# Patient Record
Sex: FEMALE | Race: WHITE | NOT HISPANIC OR LATINO | Employment: OTHER | URBAN - METROPOLITAN AREA
[De-identification: names, ages, dates, MRNs, and addresses within clinical notes are randomized per-mention and may not be internally consistent; named-entity substitution may affect disease eponyms.]

---

## 2017-06-29 ENCOUNTER — ALLSCRIPTS OFFICE VISIT (OUTPATIENT)
Dept: OTHER | Facility: OTHER | Age: 42
End: 2017-06-29

## 2017-06-29 ENCOUNTER — TRANSCRIBE ORDERS (OUTPATIENT)
Dept: MAMMOGRAPHY | Facility: CLINIC | Age: 42
End: 2017-06-29

## 2017-12-26 ENCOUNTER — ALLSCRIPTS OFFICE VISIT (OUTPATIENT)
Dept: OTHER | Facility: OTHER | Age: 42
End: 2017-12-26

## 2017-12-26 LAB — S PYO AG THROAT QL: NEGATIVE

## 2017-12-27 NOTE — PROGRESS NOTES
Assessment   1  Former smoker (D94 32) (Q19 027)   · quit 2011   2  Left otitis media with effusion (381 4) (H65 92)   3  Sinus pressure (478 19) (J34 89)    Plan   Left otitis media with effusion    · Amoxicillin 500 MG Oral Tablet; TAKE 1 TABLET 3 TIMES DAILY UNTIL GONE  Sinus pressure    · PredniSONE 10 MG Oral Tablet; 4 pills daily with food for 2 days, 3 pills daily for 2    days, 2 pills daily  for 2 days, one pill daily for 2 days  Sore throat    · (1) THROAT CULTURE (CULTURE, UPPER RESPIRATORY); Status: In Progress -    Specimen/Data Collected,Retrospective By Protocol Authorization;   Done: [id number]   · Rapid StrepA- POC; Source:Throat; Status:Resulted - Requires Verification,Retrospective    By Protocol Authorization;   Done: 84ANQ6661 10:55AM    Discussion/Summary      URI/ left OM - rapid strep was negative, will obtain throat culture and call with results if abnormal, rest, plenty of fluids, start Prednisone taper and Amoxicillin, f/u in the office if symptoms persist or worsen  Possible side effects of new medications were reviewed with the patient/guardian today  The treatment plan was reviewed with the patient/guardian  The patient/guardian understands and agrees with the treatment plan      Chief Complaint   Pt presents in the office today with c/o a sore throat, congestion and L ear pain times 6 days;    due  due 07/02/2015      History of Present Illness   HPI: Pt presents today with c/o nasal congestion, runny nose, sore throat and low grade fevers for 6 days, she woke up this am with left ear pain, no ear drainage, no purulent nasal d/c, she is also having mild non productive cough, no wheezing, no chest pain or SOB  Pt has been using saline nasal spray  Review of Systems        Constitutional: feeling tired, but-- no fever-- and-- no chills  ENT: earache,-- sore throat-- and-- nasal discharge, but-- no hoarseness        Cardiovascular: no complaints of slow or fast heart rate, no chest pain, no palpitations, no leg claudication or lower extremity edema  Respiratory: cough, but-- no shortness of breath,-- no wheezing-- and-- no shortness of breath during exertion  Gastrointestinal: no complaints of abdominal pain, no constipation, no nausea or diarrhea, no vomiting, no bloody stools  Active Problems   1  Gestational Diabetes Mellitus (648 80)   2  Left otitis media with effusion (381 4) (H65 92)   3  Nervousness (799 21) (R45 0)   4  Otitis media, right (382 9) (H66 91)   5  Panic Disorder Without Agoraphobia (300 01)   6  Sinus pressure (478 19) (J34 89)   7  Sore throat (462) (J02 9)    Past Medical History   1  History of Amenorrhea (626 0) (N91 2)   2  History of Dyspepsia (536 8) (K30)   3  History of sinusitis (V12 69) (Z87 09)   4  History of Mastitis (611 0) (N61 0)   5  History of Postpartum Depression (648 40)   6  History of Sinusitis (473 9) (J32 9)    Family History   Mother    1  Family history of Arthritis (V17 7)   2  Family history of Hyperlipidemia   3  Family history of Hypertension (V17 49)   4  Family history of Ovarian Cancer (V16 41)   5  Patient's mother is  (V24 11) (Z80 80)  Father    10  Family history of Diabetes Mellitus (V18 0)   7  Family history of Hyperlipidemia   8  Family history of Prostate Cancer (L41 64)  Multiple Family Members    9  Denied: Family history of drug abuse   10  Denied: Family history of Mental health problem  Family History Reviewed: The family history was reviewed and updated today  Social History    · Always uses seat belt   · Denied: History of Caffeine use   · Denied: History of Drug Use   · Former smoker (D99 61) (E73 691)   · quit    · Has smoke detectors   · No alcohol use  The social history was reviewed and updated today  Surgical History   1  History of  Section    Current Meds    1  Sertraline HCl - 50 MG Oral Tablet; TAKE ONE TABLET BY MOUTH EVERY DAY     NIGHTLY;      Therapy: [id number] to ([place] [facility])  Requested for: 27YJP9622; Last     Rx:09Nov2017 Ordered    Allergies   1  Demerol TABS   2  Morphine Derivatives  3  Animal dander - Cats   4  Dust    Vitals    Recorded: 26Dec2017 10:34AM   Temperature 98 3 F   Heart Rate 78   Respiration 16   Systolic 086   Diastolic 72   Height 5 ft 3 in   Weight 219 lb 3 2 oz   BMI Calculated 38 83   BSA Calculated 2 01     Physical Exam        Constitutional      General appearance: No acute distress, well appearing and well nourished  Ears, Nose, Mouth, and Throat      Otoscopic examination: Abnormal   The right tympanic membrane was normal  The left tympanic membrane was red  The right external canal was normal  The left external canal was normal  Exam of the left middle ear showed a middle ear effusion  Nasal mucosa, septum, and turbinates: Abnormal   no nasal discharge  The bilateral nasal mucosa was edematous-- and-- red  Oropharynx: Abnormal   The posterior pharynx was erythematous, but-- did not have an exudate  Pulmonary      Respiratory effort: No increased work of breathing or signs of respiratory distress  Auscultation of lungs: Clear to auscultation  Cardiovascular      Auscultation of heart: Normal rate and rhythm, normal S1 and S2, without murmurs  Lymphatic      Palpation of lymph nodes in neck: No lymphadenopathy            Results/Data   Rapid Lis Deem- POC [id number] 10:55AM [place]      Test Name Result Flag Reference   Rapid Strep Negative                      Signatures    Electronically signed by : TAD Osorio ; Dec 26 2017  1:04PM EST                       (Author)

## 2017-12-28 LAB — CULTURE RESULT (HISTORICAL): NORMAL

## 2018-01-13 VITALS
HEART RATE: 72 BPM | SYSTOLIC BLOOD PRESSURE: 126 MMHG | BODY MASS INDEX: 36.86 KG/M2 | RESPIRATION RATE: 16 BRPM | HEIGHT: 63 IN | WEIGHT: 208 LBS | TEMPERATURE: 98.6 F | DIASTOLIC BLOOD PRESSURE: 74 MMHG

## 2018-01-22 VITALS
WEIGHT: 219.2 LBS | TEMPERATURE: 98.3 F | DIASTOLIC BLOOD PRESSURE: 72 MMHG | BODY MASS INDEX: 38.84 KG/M2 | SYSTOLIC BLOOD PRESSURE: 116 MMHG | HEIGHT: 63 IN | RESPIRATION RATE: 16 BRPM | HEART RATE: 78 BPM

## 2018-05-16 DIAGNOSIS — F32.A DEPRESSION, UNSPECIFIED DEPRESSION TYPE: Primary | ICD-10-CM

## 2018-11-14 DIAGNOSIS — F32.A DEPRESSION, UNSPECIFIED DEPRESSION TYPE: ICD-10-CM

## 2019-05-12 DIAGNOSIS — F32.A DEPRESSION, UNSPECIFIED DEPRESSION TYPE: ICD-10-CM

## 2019-11-03 ENCOUNTER — TELEPHONE (OUTPATIENT)
Dept: FAMILY MEDICINE CLINIC | Facility: CLINIC | Age: 44
End: 2019-11-03

## 2019-11-03 DIAGNOSIS — F32.A DEPRESSION, UNSPECIFIED DEPRESSION TYPE: ICD-10-CM

## 2019-11-04 DIAGNOSIS — Z13.29 SCREENING FOR THYROID DISORDER: ICD-10-CM

## 2019-11-04 DIAGNOSIS — Z13.228 SCREENING FOR METABOLIC DISORDER: Primary | ICD-10-CM

## 2019-11-04 DIAGNOSIS — Z13.0 SCREENING, ANEMIA, DEFICIENCY, IRON: ICD-10-CM

## 2019-11-04 DIAGNOSIS — Z13.220 SCREENING, LIPID: ICD-10-CM

## 2019-12-08 DIAGNOSIS — F32.A DEPRESSION, UNSPECIFIED DEPRESSION TYPE: ICD-10-CM

## 2019-12-18 NOTE — TELEPHONE ENCOUNTER
Patient is booked for the 27th however she will be short 2 pills prior to the visit    Sertriline    [facility] on [address]

## 2019-12-19 DIAGNOSIS — F32.A DEPRESSION, UNSPECIFIED DEPRESSION TYPE: ICD-10-CM

## 2020-01-07 LAB
ALBUMIN SERPL-MCNC: 4.3 G/DL (ref 3.6–5.1)
ALBUMIN/GLOB SERPL: 1.2 (CALC) (ref 1–2.5)
ALP SERPL-CCNC: 56 U/L (ref 33–115)
ALT SERPL-CCNC: 16 U/L (ref 6–29)
AST SERPL-CCNC: 16 U/L (ref 10–30)
BASOPHILS # BLD AUTO: 68 CELLS/UL (ref 0–200)
BASOPHILS NFR BLD AUTO: 1.2 %
BILIRUB SERPL-MCNC: 0.5 MG/DL (ref 0.2–1.2)
BUN SERPL-MCNC: 13 MG/DL (ref 7–25)
BUN/CREAT SERPL: NORMAL (CALC) (ref 6–22)
CALCIUM SERPL-MCNC: 9.3 MG/DL (ref 8.6–10.2)
CHLORIDE SERPL-SCNC: 100 MMOL/L (ref 98–110)
CHOLEST SERPL-MCNC: 260 MG/DL
CHOLEST/HDLC SERPL: 5.2 (CALC)
CO2 SERPL-SCNC: 29 MMOL/L (ref 20–32)
CREAT SERPL-MCNC: 0.6 MG/DL (ref 0.5–1.1)
EOSINOPHIL # BLD AUTO: 131 CELLS/UL (ref 15–500)
EOSINOPHIL NFR BLD AUTO: 2.3 %
ERYTHROCYTE [DISTWIDTH] IN BLOOD BY AUTOMATED COUNT: 13.2 % (ref 11–15)
GLOBULIN SER CALC-MCNC: 3.5 G/DL (CALC) (ref 1.9–3.7)
GLUCOSE SERPL-MCNC: 96 MG/DL (ref 65–99)
HCT VFR BLD AUTO: 42.3 % (ref 35–45)
HDLC SERPL-MCNC: 50 MG/DL
HGB BLD-MCNC: 14.2 G/DL (ref 11.7–15.5)
LDLC SERPL CALC-MCNC: 185 MG/DL (CALC)
LYMPHOCYTES # BLD AUTO: 1682 CELLS/UL (ref 850–3900)
LYMPHOCYTES NFR BLD AUTO: 29.5 %
MCH RBC QN AUTO: 28.5 PG (ref 27–33)
MCHC RBC AUTO-ENTMCNC: 33.6 G/DL (ref 32–36)
MCV RBC AUTO: 84.9 FL (ref 80–100)
MONOCYTES # BLD AUTO: 405 CELLS/UL (ref 200–950)
MONOCYTES NFR BLD AUTO: 7.1 %
NEUTROPHILS # BLD AUTO: 3414 CELLS/UL (ref 1500–7800)
NEUTROPHILS NFR BLD AUTO: 59.9 %
NONHDLC SERPL-MCNC: 210 MG/DL (CALC)
PLATELET # BLD AUTO: 278 THOUSAND/UL (ref 140–400)
PMV BLD REES-ECKER: 9.6 FL (ref 7.5–12.5)
POTASSIUM SERPL-SCNC: 3.6 MMOL/L (ref 3.5–5.3)
PROT SERPL-MCNC: 7.8 G/DL (ref 6.1–8.1)
RBC # BLD AUTO: 4.98 MILLION/UL (ref 3.8–5.1)
SL AMB EGFR AFRICAN AMERICAN: 129 ML/MIN/1.73M2
SL AMB EGFR NON AFRICAN AMERICAN: 112 ML/MIN/1.73M2
SODIUM SERPL-SCNC: 139 MMOL/L (ref 135–146)
TRIGL SERPL-MCNC: 118 MG/DL
TSH SERPL-ACNC: 2.11 MIU/L
WBC # BLD AUTO: 5.7 THOUSAND/UL (ref 3.8–10.8)

## 2020-01-09 ENCOUNTER — OFFICE VISIT (OUTPATIENT)
Dept: FAMILY MEDICINE CLINIC | Facility: CLINIC | Age: 45
End: 2020-01-09
Payer: COMMERCIAL

## 2020-01-09 VITALS
BODY MASS INDEX: 36.25 KG/M2 | SYSTOLIC BLOOD PRESSURE: 116 MMHG | HEART RATE: 72 BPM | WEIGHT: 197 LBS | DIASTOLIC BLOOD PRESSURE: 70 MMHG | HEIGHT: 62 IN

## 2020-01-09 DIAGNOSIS — R45.0 NERVOUSNESS: ICD-10-CM

## 2020-01-09 DIAGNOSIS — Z12.31 ENCOUNTER FOR SCREENING MAMMOGRAM FOR BREAST CANCER: ICD-10-CM

## 2020-01-09 DIAGNOSIS — Z01.419 GYNECOLOGIC EXAM NORMAL: ICD-10-CM

## 2020-01-09 DIAGNOSIS — H02.66 XANTHELASMA OF EYELID, BILATERAL: ICD-10-CM

## 2020-01-09 DIAGNOSIS — F41.0 PANIC DISORDER WITHOUT AGORAPHOBIA: ICD-10-CM

## 2020-01-09 DIAGNOSIS — H02.63 XANTHELASMA OF EYELID, BILATERAL: ICD-10-CM

## 2020-01-09 DIAGNOSIS — F32.A DEPRESSION, UNSPECIFIED DEPRESSION TYPE: ICD-10-CM

## 2020-01-09 DIAGNOSIS — Z12.4 PAP SMEAR FOR CERVICAL CANCER SCREENING: ICD-10-CM

## 2020-01-09 DIAGNOSIS — Z01.419 ENCOUNTER FOR GYNECOLOGICAL EXAMINATION WITH PAPANICOLAOU SMEAR OF CERVIX: Primary | ICD-10-CM

## 2020-01-09 DIAGNOSIS — E78.00 HYPERCHOLESTEROLEMIA: ICD-10-CM

## 2020-01-09 PROCEDURE — 3008F BODY MASS INDEX DOCD: CPT | Performed by: FAMILY MEDICINE

## 2020-01-09 PROCEDURE — 99214 OFFICE O/P EST MOD 30 MIN: CPT | Performed by: FAMILY MEDICINE

## 2020-01-09 PROCEDURE — 99396 PREV VISIT EST AGE 40-64: CPT | Performed by: FAMILY MEDICINE

## 2020-01-09 NOTE — PROGRESS NOTES
Assessment/plan;    Pap test done /gyn exam done  Cervix had small little round lesions like poker dots all over it  These were not tender and are not raised   These areas were swiped well with the brush for any abnormalities  We will send a card regarding her Pap test  Patient will get her mammogram done  Next Pap test 2 years         Patient is here for her yearly checkup  She is ready to get her mammogram as she had prolonged nursing with her last child and this is done   She had a tubal ligation    Her periods are starting to get very a regular as she feels she has around [place] menopausal  She does not know when her mother was menopausal   She has no gyn complaints      Health Maintenance   Topic Date Due    MAMMOGRAM  1976    HIV Screening  1991    BMI: Followup Plan  1994    Cervical Cancer Screening  1997    Influenza Vaccine  2019    Depression Screening PHQ  2021    BMI: Adult  2021    DTaP,Tdap,and Td Vaccines (3 - Td) 2023    Pneumococcal Vaccine: 65+ Years (1 of 2 - PCV13) 2041    Pneumococcal Vaccine: Pediatrics (0 to 5 Years) and At-Risk Patients (6 to 59 Years)  Aged Out    HIB Vaccine  Aged Out    Hepatitis B Vaccine  Aged Out    IPV Vaccine  Aged Out    Hepatitis A Vaccine  Aged Out    Meningococcal ACWY Vaccine  Aged Out    HPV Vaccine  Aged Out       Gynecologic History  No LMP recorded   Patient is perimenopausal      Contraception: tubal ligation  Next mammogram:now  Last DXA:NA  Next colon if >48 y/o: NA     Social History     Tobacco Use    Smoking status: Former Smoker     Last attempt to quit: 2011     Years since quittin 0    Smokeless tobacco: Never Used   Substance Use Topics    Alcohol use: Never     Frequency: Never    Drug use: Never     Comment: Denied: History of drug use - As per [employer]      Family History   Problem Relation Age of Onset    Arthritis Mother     Hyperlipidemia Mother     Hypertension Mother     Ovarian cancer Mother     Diabetes Father     Hyperlipidemia Father     Prostate cancer Father     Breast cancer Sister     Alcohol abuse Neg Hx     Substance Abuse Neg Hx     Mental illness Neg Hx      Past Surgical History:   Procedure Laterality Date     SECTION      1999, second       Current Outpatient Medications   Medication Sig Dispense Refill    sertraline (ZOLOFT) 50 mg tablet Take 1 tablet (50 mg total) by mouth every evening 90 tablet 3     No current facility-administered medications for this visit             Review of Systems   Constitutional  No fatigue, No weight loss, No weight gain, No fever, No chills    Respiratory  No dyspnea, No cough, No hemoptysis, No wheezing, No pleuritic pain    Cardiovascular  No chest pain, No palpitations, No arrhythmia, No orthopnea, No nocturnal dyspnea, No edema, No claudication    Breasts (R,L)  No discharge from nipple, No breast tenderness, No breast mass    Gastrointestinal  No loss of appetite, No dysphagia, No abdominal pain, No nausea, No vomiting, No change in bowel habits, No diarrhea, No constipation, No blood in stool    Genitourinary, Female  No increased frequency of urination, No dysuria, No hematuria, No nocturia, No urinary incontinence, No vaginal discharge, No abnormal vaginal bleeding, No pelvic pain,  Menstrual periods are becoming irregularly spaced, No menopausal problem    Neurologic  No headache, No dizziness, No lightheadedness, No syncope, No vertigo, No weakness, No numbness, No paresthesia, No tremor    Psychiatric  No difficulty sleeping, No mood swings, Not feeling anxious, Not feeling depressed, No confusion, No memory loss    Muscular skeletal  Myalgias, arthralgias, no joint swelling or stiffness, no limb pain or swelling    Heme  No swollen glands, no easy bruising    Objective:    Vitals:    20 1609   BP: 116/70   Pulse: 72       Lab Results   Component Value Date    WBC 5 7 2020 HGB 14 2 01/06/2020    HCT 42 3 01/06/2020     01/06/2020    TRIG 118 01/06/2020    HDL 50 (L) 01/06/2020    ALT 16 01/06/2020    AST 16 01/06/2020    K 3 6 01/06/2020     01/06/2020    CREATININE 0 60 01/06/2020    BUN 13 01/06/2020    CO2 29 01/06/2020     BP Readings from Last 3 Encounters:   01/09/20 116/70   12/26/17 116/72   06/29/17 126/74     Ht Readings from Last 3 Encounters:   01/09/20 5' 2 25" (1 581 m)   12/26/17 5' 3" (1 6 m)   06/29/17 5' 3" (1 6 m)     @lastweight(3)@  BMI Readings from Last 3 Encounters:   01/09/20 35 74 kg/m²   12/26/17 38 83 kg/m²   06/29/17 36 85 kg/m²          Physical Exam  Constitutional  Appears well, Looks well, Appearance consistent with age    Mental Status  Alert, Cooperative, oriented to time person and place, recent and remote memory intact, mood and affect normal , patient is reasonable    Neck  No neck mass, No thyromegaly, No thyroid nodule, No thyroid tenderness    Respiratory  Respiratory effort normal, Breath sounds normal, No rales, No rhonchi, No wheezing     Cardiac  Heart rate normal, Heart rhythm normal, Heart sounds normal, No heart murmur    Vascular  Carotid pulse normal, No carotid bruit, No varicose veins, No leg edema    Breasts (R,L)  No discharge from nipple, No breast tenderness, No breast mass, No nipple retraction, No skin changes    Gastrointestinal  Bowel sounds normal, Abdomen soft, No hepatomegaly, No splenomegaly, No abdominal tenderness, No abdominal mass, No hernia, No hemorrhoids    Genitourinary, Female  Vulva normal, No erythema of urethra    Genitourinary, Female  Vulva normal, No erythema of vulva, Vaginal mucosa normal, No vaginal discharge, No lesion of urethra, No urethral discharge, No bladder distention, No bladder tenderness, Cervix normal, No cervical inflammation,  Small less than 1 mm round dots almost circles too numerous to count and appeared flat all around patient's cervix    Some  Seem to be on the vagina proximal to the cervix, No cervical discharge, No cervical tenderness, Uterus normal size, No uterine tenderness, No adnexal tenderness, No adnexal mass, No pelvic mass    Lymphatics  No axillary lymphadenopathy, No inguinal lymphadenopathy    Skin  Pattern of hair growth normal, No skin rash, No abnormal skin lesion, No acne

## 2020-01-09 NOTE — PATIENT INSTRUCTIONS
1  You labs are perfect other than your cholesterol, we will check it again next year as you continue to do your awesome job losing weight    2  Please get her mammogram done and the place we mammogram gets done we will call you with results    3   We will either send you a card or call you with your results on her Pap test    When your on her last refill of your sertraline, consider calling the office to make an appointment for a year from now and ask us to get you lab slips so he could get the blood work done before you come in  Otherwise we will see you as needed prior to that time

## 2020-01-09 NOTE — PROGRESS NOTES
Assessment/Plan:    Nervousness/panic disorder/ depression  Patient doing great with sertraline will continue the same  Refills called in for year   When patient gets close to the end of the refill she will call in for blood work in her yearly check    Elevated blood sugars during pregnancy   We will do an A1c next visit as well specially since her dad has diabetes     Xanthelasma / hypercholesterolemia   Patient noticed her eye changes when she started gaining weight   Hopefully with continued loss await her cholesterol will improve and her eye lesions will disappear    Recheck in 1 year   Screening labs A1c prior        BMI Counseling: Body mass index is 35 74 kg/m²  The BMI is above normal  Nutrition recommendations include decreasing portion sizes and encouraging healthy choices of fruits and vegetables  Exercise recommendations include exercising 3-5 times per week  Patient has already lost 40 lb on her own  She is happy with her diet of lean meat and vegetables and will continue with the same  She has had no brain fogginess            Subjective:   Caitlyn Lopez is a 38 y  o female  Chief Complaint   Patient presents with    Gynecologic Exam      Patient is here for her yearly checkup  She takes her sertraline which works wonderfully for her    She is proud to tell us that she has lost 40 lb on a keto diet that she eats plenty of vegetables but stays away from everything white  She did notice that her eyelids changed colors when she started gaining a lot of weight  She gained a lot a weight when her mother who she was very close to   She ate food when she was sad  And she had a lot  When her feet started hurting could her arches were falling in her back started hurting she decided to stop and to get herself back in line              Past Medical History:   Diagnosis Date    Amenorrhea     Last assessed 2013     Dyspepsia     Last assessed 2013     Postpartum depression     Onset date       Social History     Tobacco Use    Smoking status: Former Smoker     Last attempt to quit: 2011     Years since quittin 0    Smokeless tobacco: Never Used   Substance Use Topics    Alcohol use: Never     Frequency: Never    Drug use: Never     Comment: Denied: History of drug use - As per [employer]      Family History   Problem Relation Age of Onset    Arthritis Mother     Hyperlipidemia Mother     Hypertension Mother     Ovarian cancer Mother     Diabetes Father     Hyperlipidemia Father     Prostate cancer Father     Breast cancer Sister     Alcohol abuse Neg Hx     Substance Abuse Neg Hx     Mental illness Neg Hx        MEDICATIONS REVIEWED AND UPDATED    10 point review of systems performed, the remainder of the ROS is negative except for what is noted in the history of chief complaint    Objective:    Vitals:    20 1609   BP: 116/70   Pulse: 72     Body mass index is 35 74 kg/m²  Physical Exam    General  Patient in no acute distress, well appearing, well nourished and appears stated age    Mental status  Patient admitted to missing office visits out of fear of what could be found! Good judgment and insight, oriented to time person and place, recent and remote memory is intact, mood and affect are normal, cooperative, and patient is reasonable

## 2020-01-14 LAB
CLINICAL INFO: NORMAL
DATE PREVIOUS BX: NORMAL
LMP START DATE: NORMAL
SL AMB PREV. PAP:: NORMAL
SPECIMEN SOURCE CVX/VAG CYTO: NORMAL

## 2020-01-21 ENCOUNTER — PROCEDURE VISIT (OUTPATIENT)
Dept: FAMILY MEDICINE CLINIC | Facility: CLINIC | Age: 45
End: 2020-01-21
Payer: COMMERCIAL

## 2020-01-21 VITALS
HEART RATE: 72 BPM | HEIGHT: 63 IN | SYSTOLIC BLOOD PRESSURE: 120 MMHG | BODY MASS INDEX: 35.3 KG/M2 | DIASTOLIC BLOOD PRESSURE: 70 MMHG | WEIGHT: 199.2 LBS

## 2020-01-21 DIAGNOSIS — D49.2 SKIN NEOPLASM: Primary | ICD-10-CM

## 2020-01-21 PROCEDURE — 11300 SHAVE SKIN LESION 0.5 CM/<: CPT | Performed by: FAMILY MEDICINE

## 2020-01-21 NOTE — PATIENT INSTRUCTIONS
Keep the area clean and dry for the rest of today  Tomorrow take off the dressing and take a shower or wash as she normally would  Please wash this area with soap and water being careful to the area  Please put antibiotic or Vaseline on the pad of a Band-Aid and cover this area    If you cannot reach your self, please ask someone to assist you  Please do this for at least 10 days or until it is totally healed    Please call if you see any signs of infection such as:  Redness  Discharge  You have pain  The area swollen

## 2020-01-21 NOTE — PROGRESS NOTES
Patient is here with 2 pedunculated moles that have continuously pulled on her back in her front from her bra  They are irritated and continue to get longer  There is 1 on her front left area under her breast on her ribs that as a 3 mm base  There is 1 on her right back at the tip of her scapula also with a 3 mm meter base   Both have brown coloration to them    Shave lesion  Date/Time: 1/21/2020 12:54 PM  Performed by: Hood Gresham DO  Authorized by: Hood Gresham DO     Number of Lesions: 2  Lesion 1:     Body area: trunk    Trunk location: chest    Initial size (mm): 3    Final defect size (mm): 4    Malignancy: benign lesion      Destruction method: shave removal    Lesion 2:     Body area: trunk    Trunk location: back    Initial size (mm): 3    Final defect size (mm): 4    Malignancy: benign lesion      Destruction method: shave removal      Comments:   Procedure:  Consent was signed after explained to patient   each Area was cleaned with alcohol and anesthetized with epinephrine 1% and a 9-1 mixture with sodium bicarb  The area was shaved and cautery was established with calcium chloride and electrodesiccation    Area was dressed with antibiotic ointment and a Band-Aid   Patient tolerated procedure well  Pathology sent

## 2020-01-24 LAB
CLINICAL INFO: NORMAL
SPECIMEN SOURCE: NORMAL

## 2020-01-25 NOTE — RESULT ENCOUNTER NOTE
Please call patient regarding skin biopsy  It is negative  Nothing more needs to be done    Thank you

## 2020-03-13 ENCOUNTER — OFFICE VISIT (OUTPATIENT)
Dept: FAMILY MEDICINE CLINIC | Facility: CLINIC | Age: 45
End: 2020-03-13
Payer: COMMERCIAL

## 2020-03-13 VITALS
DIASTOLIC BLOOD PRESSURE: 80 MMHG | BODY MASS INDEX: 37.49 KG/M2 | RESPIRATION RATE: 16 BRPM | HEART RATE: 74 BPM | WEIGHT: 211.6 LBS | SYSTOLIC BLOOD PRESSURE: 122 MMHG | HEIGHT: 63 IN | TEMPERATURE: 98.8 F

## 2020-03-13 DIAGNOSIS — J20.9 ACUTE BRONCHITIS, UNSPECIFIED ORGANISM: Primary | ICD-10-CM

## 2020-03-13 DIAGNOSIS — J01.00 ACUTE NON-RECURRENT MAXILLARY SINUSITIS: ICD-10-CM

## 2020-03-13 DIAGNOSIS — R05.9 COUGH: ICD-10-CM

## 2020-03-13 PROCEDURE — 1036F TOBACCO NON-USER: CPT | Performed by: FAMILY MEDICINE

## 2020-03-13 PROCEDURE — 3008F BODY MASS INDEX DOCD: CPT | Performed by: FAMILY MEDICINE

## 2020-03-13 PROCEDURE — 99214 OFFICE O/P EST MOD 30 MIN: CPT | Performed by: FAMILY MEDICINE

## 2020-03-13 RX ORDER — PREDNISONE 10 MG/1
TABLET ORAL
Qty: 20 TABLET | Refills: 0 | Status: SHIPPED | OUTPATIENT
Start: 2020-03-13 | End: 2020-04-16 | Stop reason: ALTCHOICE

## 2020-03-13 RX ORDER — AZITHROMYCIN 250 MG/1
TABLET, FILM COATED ORAL
Qty: 10 TABLET | Refills: 0 | Status: SHIPPED | OUTPATIENT
Start: 2020-03-13 | End: 2020-03-23

## 2020-03-13 NOTE — PATIENT INSTRUCTIONS
Acute bronchitis with right-sided maxillary sinusitis  Patient will do the followin  She will take the azithromycin, the antibiotic 1 daily until the bottle is empty    2  She will take prednisone as follows:   Today Friday and tomorrow Saturday 4 pills at once after eating   and Monday 3 pills at once after eating   2 pills at once after eating  Thursday and Friday 1 pill daily after eating    If she is not at least improving a little bit 48 hours she is to let us now  If she gets worse she is to let us now  Otherwise she should just gradually improving start feeling better  The prednisone will help open up her lungs and decrease her cough and the antibiotic will take away the infection    Otherwise recheck as scheduled or needed

## 2020-03-13 NOTE — PROGRESS NOTES
Assessment/Plan:    Acute bronchitis with right-sided maxillary sinusitis  Patient will do the followin  She will take the azithromycin, the antibiotic 1 daily until the bottle is empty    2  She will take prednisone as follows: Today Friday and tomorrow Saturday 4 pills at once after eating   and Monday 3 pills at once after eating   2 pills at once after eating  Thursday and Friday 1 pill daily after eating    If she is not at least improving a little bit 48 hours she is to let us now  If she gets worse she is to let us now  Otherwise she should just gradually improving start feeling better  The prednisone will help open up her lungs and decrease her cough and the antibiotic will take away the infection    Otherwise recheck as scheduled or needed               Subjective:   Bella Armas is a 38 y  o female  Chief Complaint   Patient presents with    Cough    Fever     104 4 on tuesday     chest congestion    Wheezing     A week ago today patient started with a headache that eventually developed 2 days later into general malaise and then a fever of 104 4  She took a tepid bath, took some Tylenol and the fever came down  Did not have the fever ever since that time  However she did develop a cough with wheezing no real shortness of breath just feeling tight  She is not bringing up any sputum either  Nobody at home is ill either  She just wants to breathe better        Past Medical History:   Diagnosis Date    Amenorrhea     Last assessed 2013     Dyspepsia     Last assessed 2013     Postpartum depression     Onset date       Social History     Tobacco Use    Smoking status: Former Smoker     Last attempt to quit:      Years since quittin 2    Smokeless tobacco: Never Used   Substance Use Topics    Alcohol use: Never     Frequency: Never    Drug use: Never     Comment: Denied: History of drug use - As per [employer]      Family History   Problem Relation Age of Onset    Arthritis Mother     Hyperlipidemia Mother     Hypertension Mother     Ovarian cancer Mother     Diabetes Father     Hyperlipidemia Father     Prostate cancer Father     Breast cancer Sister     Alcohol abuse Neg Hx     Substance Abuse Neg Hx     Mental illness Neg Hx        MEDICATIONS REVIEWED AND UPDATED    10 point review of systems performed, the remainder of the ROS is negative except for what is noted in the history of chief complaint    Objective:    Vitals:    03/13/20 1032   BP: 122/80   Pulse: 74   Resp: 16   Temp: 98 8 °F (37 1 °C)     Body mass index is 37 48 kg/m²  Physical Exam    Constitutional  Appears healthy, Looks well, Appearance consistent with age    Mental Status  Alert, Oriented, Cooperative, Memory function normal , clean, and reasonable    HEENT  TMs are clear normal turbinates red and open right side is somewhat close with white yellow discharge pharynx benign  Some tenderness with percussion over right maxillary sinus    Neck  No neck mass, No thyromegaly, Good carotid upstrokes bilaterally, trachea midline positive click    Respiratory  Patient has mid to end expiratory wheezes throughout with scattered rhonchi anteriorly that transmit posteriorly    No tactile fremitus, no rales    Cardiac   Regular rhythm without ectopy or murmur no S3-S4, no heave lift or thrill to palpation    Vascular  No leg edema, No pedal edema    Muscular skeletal  No clubbing cyanosis , muscle tone normal    Skin  No appreciable rashes or abnormal appearing lesions

## 2020-03-17 ENCOUNTER — TELEPHONE (OUTPATIENT)
Dept: FAMILY MEDICINE CLINIC | Facility: CLINIC | Age: 45
End: 2020-03-17

## 2020-03-17 NOTE — TELEPHONE ENCOUNTER
Patient was in to see you Friday  She was diagnosed with Bronchitis  She is on a Zpack and is taking Prednisone  She is not yet finished with the medication  She wants to know if the Bronchitis should have been cleared by now?     Best number for Reji green:  [phone]

## 2020-04-15 ENCOUNTER — TELEPHONE (OUTPATIENT)
Dept: FAMILY MEDICINE CLINIC | Facility: CLINIC | Age: 45
End: 2020-04-15

## 2020-04-16 DIAGNOSIS — F32.A DEPRESSION, UNSPECIFIED DEPRESSION TYPE: ICD-10-CM

## 2021-01-31 DIAGNOSIS — Z79.899 ENCOUNTER FOR LONG-TERM (CURRENT) USE OF MEDICATIONS: ICD-10-CM

## 2021-01-31 DIAGNOSIS — E78.00 HYPERCHOLESTEROLEMIA: Primary | ICD-10-CM

## 2021-01-31 DIAGNOSIS — F32.A DEPRESSION, UNSPECIFIED DEPRESSION TYPE: ICD-10-CM

## 2021-01-31 DIAGNOSIS — R73.01 ELEVATED FASTING BLOOD SUGAR: ICD-10-CM

## 2021-01-31 NOTE — TELEPHONE ENCOUNTER
Received patient's request for sertraline  I will send 30 pills  Patient needs to have fasting blood work done and a 30 minutes office visit in order to get more refills  Please inform patient of the above, thank you

## 2021-03-05 DIAGNOSIS — F32.A DEPRESSION, UNSPECIFIED DEPRESSION TYPE: ICD-10-CM

## 2021-03-30 DIAGNOSIS — Z23 ENCOUNTER FOR IMMUNIZATION: ICD-10-CM

## 2021-04-29 DIAGNOSIS — F32.A DEPRESSION, UNSPECIFIED DEPRESSION TYPE: ICD-10-CM

## 2021-04-29 NOTE — TELEPHONE ENCOUNTER
For you have already called this patient in January to set up her recheck  Her labs are ordered in she needs a 30 minutes office visit   Received another request for her sertraline   Only 30 days will be called in

## 2021-05-29 DIAGNOSIS — F32.A DEPRESSION, UNSPECIFIED DEPRESSION TYPE: ICD-10-CM

## 2021-06-03 ENCOUNTER — TELEPHONE (OUTPATIENT)
Dept: FAMILY MEDICINE CLINIC | Facility: CLINIC | Age: 46
End: 2021-06-03

## 2021-06-03 DIAGNOSIS — F32.A DEPRESSION, UNSPECIFIED DEPRESSION TYPE: ICD-10-CM

## 2021-06-03 NOTE — TELEPHONE ENCOUNTER
Pt scheduled her annual PE for 6/28 and would like to know what labs she needs prior to visit? Also, can you please refill her zoloft to get her to her appt? Send to [facility]

## 2021-06-03 NOTE — TELEPHONE ENCOUNTER
Labs are already in the system  If she needs them to be printed please print them for her  Thank you

## 2021-06-25 ENCOUNTER — APPOINTMENT (OUTPATIENT)
Dept: LAB | Facility: CLINIC | Age: 46
End: 2021-06-25
Payer: COMMERCIAL

## 2021-06-25 DIAGNOSIS — Z79.899 ENCOUNTER FOR LONG-TERM (CURRENT) USE OF OTHER MEDICATIONS: ICD-10-CM

## 2021-06-25 DIAGNOSIS — R73.01 IMPAIRED FASTING GLUCOSE: Primary | ICD-10-CM

## 2021-06-25 LAB
25(OH)D3 SERPL-MCNC: 14 NG/ML (ref 30–100)
ALBUMIN SERPL BCP-MCNC: 3.4 G/DL (ref 3.5–5)
ALP SERPL-CCNC: 66 U/L (ref 46–116)
ALT SERPL W P-5'-P-CCNC: 27 U/L (ref 12–78)
ANION GAP SERPL CALCULATED.3IONS-SCNC: 6 MMOL/L (ref 4–13)
AST SERPL W P-5'-P-CCNC: 16 U/L (ref 5–45)
BACTERIA UR QL AUTO: ABNORMAL /HPF
BASOPHILS # BLD AUTO: 0.07 THOUSANDS/ΜL (ref 0–0.1)
BASOPHILS NFR BLD AUTO: 1 % (ref 0–1)
BILIRUB SERPL-MCNC: 0.32 MG/DL (ref 0.2–1)
BILIRUB UR QL STRIP: NEGATIVE
BUN SERPL-MCNC: 8 MG/DL (ref 5–25)
CALCIUM ALBUM COR SERPL-MCNC: 9.5 MG/DL (ref 8.3–10.1)
CALCIUM SERPL-MCNC: 9 MG/DL (ref 8.3–10.1)
CHLORIDE SERPL-SCNC: 104 MMOL/L (ref 100–108)
CHOLEST SERPL-MCNC: 243 MG/DL (ref 50–200)
CLARITY UR: CLEAR
CO2 SERPL-SCNC: 27 MMOL/L (ref 21–32)
COLOR UR: YELLOW
CREAT SERPL-MCNC: 0.59 MG/DL (ref 0.6–1.3)
EOSINOPHIL # BLD AUTO: 0.18 THOUSAND/ΜL (ref 0–0.61)
EOSINOPHIL NFR BLD AUTO: 4 % (ref 0–6)
ERYTHROCYTE [DISTWIDTH] IN BLOOD BY AUTOMATED COUNT: 13.8 % (ref 11.6–15.1)
EST. AVERAGE GLUCOSE BLD GHB EST-MCNC: 131 MG/DL
GFR SERPL CREATININE-BSD FRML MDRD: 112 ML/MIN/1.73SQ M
GLUCOSE P FAST SERPL-MCNC: 114 MG/DL (ref 65–99)
GLUCOSE UR STRIP-MCNC: NEGATIVE MG/DL
HBA1C MFR BLD: 6.2 %
HCT VFR BLD AUTO: 45.7 % (ref 34.8–46.1)
HDLC SERPL-MCNC: 46 MG/DL
HGB BLD-MCNC: 14.4 G/DL (ref 11.5–15.4)
HGB UR QL STRIP.AUTO: NEGATIVE
IMM GRANULOCYTES # BLD AUTO: 0.01 THOUSAND/UL (ref 0–0.2)
IMM GRANULOCYTES NFR BLD AUTO: 0 % (ref 0–2)
KETONES UR STRIP-MCNC: NEGATIVE MG/DL
LDLC SERPL CALC-MCNC: 175 MG/DL (ref 0–100)
LEUKOCYTE ESTERASE UR QL STRIP: ABNORMAL
LYMPHOCYTES # BLD AUTO: 1.65 THOUSANDS/ΜL (ref 0.6–4.47)
LYMPHOCYTES NFR BLD AUTO: 32 % (ref 14–44)
MCH RBC QN AUTO: 27.7 PG (ref 26.8–34.3)
MCHC RBC AUTO-ENTMCNC: 31.5 G/DL (ref 31.4–37.4)
MCV RBC AUTO: 88 FL (ref 82–98)
MONOCYTES # BLD AUTO: 0.36 THOUSAND/ΜL (ref 0.17–1.22)
MONOCYTES NFR BLD AUTO: 7 % (ref 4–12)
NEUTROPHILS # BLD AUTO: 2.86 THOUSANDS/ΜL (ref 1.85–7.62)
NEUTS SEG NFR BLD AUTO: 56 % (ref 43–75)
NITRITE UR QL STRIP: NEGATIVE
NON-SQ EPI CELLS URNS QL MICRO: ABNORMAL /HPF
NONHDLC SERPL-MCNC: 197 MG/DL
NRBC BLD AUTO-RTO: 0 /100 WBCS
PH UR STRIP.AUTO: 7 [PH]
PLATELET # BLD AUTO: 304 THOUSANDS/UL (ref 149–390)
PMV BLD AUTO: 9.5 FL (ref 8.9–12.7)
POTASSIUM SERPL-SCNC: 3.7 MMOL/L (ref 3.5–5.3)
PROT SERPL-MCNC: 8.3 G/DL (ref 6.4–8.2)
PROT UR STRIP-MCNC: NEGATIVE MG/DL
RBC # BLD AUTO: 5.2 MILLION/UL (ref 3.81–5.12)
RBC #/AREA URNS AUTO: ABNORMAL /HPF
SODIUM SERPL-SCNC: 137 MMOL/L (ref 136–145)
SP GR UR STRIP.AUTO: 1.01 (ref 1–1.03)
TRIGL SERPL-MCNC: 109 MG/DL
TSH SERPL DL<=0.05 MIU/L-ACNC: 1.59 UIU/ML (ref 0.36–3.74)
UROBILINOGEN UR QL STRIP.AUTO: 0.2 E.U./DL
WBC # BLD AUTO: 5.13 THOUSAND/UL (ref 4.31–10.16)
WBC #/AREA URNS AUTO: ABNORMAL /HPF

## 2021-06-25 PROCEDURE — 85025 COMPLETE CBC W/AUTO DIFF WBC: CPT

## 2021-06-25 PROCEDURE — 83036 HEMOGLOBIN GLYCOSYLATED A1C: CPT

## 2021-06-25 PROCEDURE — 80061 LIPID PANEL: CPT

## 2021-06-25 PROCEDURE — 84443 ASSAY THYROID STIM HORMONE: CPT

## 2021-06-25 PROCEDURE — 81001 URINALYSIS AUTO W/SCOPE: CPT

## 2021-06-25 PROCEDURE — 82306 VITAMIN D 25 HYDROXY: CPT

## 2021-06-25 PROCEDURE — 36415 COLL VENOUS BLD VENIPUNCTURE: CPT

## 2021-06-25 PROCEDURE — 80053 COMPREHEN METABOLIC PANEL: CPT

## 2021-06-28 ENCOUNTER — OFFICE VISIT (OUTPATIENT)
Dept: FAMILY MEDICINE CLINIC | Facility: CLINIC | Age: 46
End: 2021-06-28
Payer: COMMERCIAL

## 2021-06-28 VITALS
DIASTOLIC BLOOD PRESSURE: 78 MMHG | WEIGHT: 214.1 LBS | HEIGHT: 63 IN | RESPIRATION RATE: 16 BRPM | SYSTOLIC BLOOD PRESSURE: 120 MMHG | TEMPERATURE: 98.4 F | BODY MASS INDEX: 37.93 KG/M2 | HEART RATE: 80 BPM

## 2021-06-28 DIAGNOSIS — E55.9 VITAMIN D DEFICIENCY: ICD-10-CM

## 2021-06-28 DIAGNOSIS — Z00.00 ROUTINE GENERAL MEDICAL EXAMINATION AT A HEALTH CARE FACILITY: Primary | ICD-10-CM

## 2021-06-28 DIAGNOSIS — E78.00 HYPERCHOLESTEROLEMIA: ICD-10-CM

## 2021-06-28 DIAGNOSIS — Z12.31 ENCOUNTER FOR SCREENING MAMMOGRAM FOR MALIGNANT NEOPLASM OF BREAST: ICD-10-CM

## 2021-06-28 DIAGNOSIS — F41.0 PANIC DISORDER WITHOUT AGORAPHOBIA: ICD-10-CM

## 2021-06-28 DIAGNOSIS — R73.01 ELEVATED FASTING BLOOD SUGAR: ICD-10-CM

## 2021-06-28 PROCEDURE — 99214 OFFICE O/P EST MOD 30 MIN: CPT | Performed by: FAMILY MEDICINE

## 2021-06-28 PROCEDURE — 3008F BODY MASS INDEX DOCD: CPT | Performed by: FAMILY MEDICINE

## 2021-06-28 PROCEDURE — 99396 PREV VISIT EST AGE 40-64: CPT | Performed by: FAMILY MEDICINE

## 2021-06-28 PROCEDURE — 1036F TOBACCO NON-USER: CPT | Performed by: FAMILY MEDICINE

## 2021-06-28 PROCEDURE — 3725F SCREEN DEPRESSION PERFORMED: CPT | Performed by: FAMILY MEDICINE

## 2021-06-28 NOTE — PATIENT INSTRUCTIONS
1  Try to avoid or really cut down all food made with process to white flour and white sugar  Mostly that is pasta breads crackers sweet drinks and the obvious cookies cakes candies etcetera  Increase your activity as this helps to use up the sugar as energy get rid of it    2  Start your exercise but go slow so you do not herself    3  Consider Lactaid milk the red box or the blue box, it will last longer    4   Add vitamin-D 5000 units to her regimen and take it daily  Do not by a [facility]     We will see you back in 4 months with fasting blood work to check your A1c, cholesterol, and vitamin-D

## 2021-06-28 NOTE — PROGRESS NOTES
SUBJECTIVE:-------------------------------------------------------------------------------------------    Mary Weinstein is a 41 y o   female and is here for routine health maintenance  Health Maintenance   Topic Date Due    Hepatitis C Screening  Never done    MAMMOGRAM  Never done    HIV Screening  Never done    BMI: Followup Plan  01/09/2021    Annual Physical  01/09/2021    Influenza Vaccine (Season Ended) 09/01/2021    Cervical Cancer Screening  01/09/2022    Depression Screening PHQ  06/28/2022    BMI: Adult  06/28/2022    DTaP,Tdap,and Td Vaccines (3 - Td or Tdap) 12/26/2023    COVID-19 Vaccine  Completed    Pneumococcal Vaccine: Pediatrics (0 to 5 Years) and At-Risk Patients (6 to 59 Years)  Aged Out    HIB Vaccine  Aged Out    Hepatitis B Vaccine  Aged Out    IPV Vaccine  Aged Out    Hepatitis A Vaccine  Aged Out    Meningococcal ACWY Vaccine  Aged Out    HPV Vaccine  Aged [employer] History   Administered Date(s) Administered    INFLUENZA 11/12/2012, 10/01/2013    SARS-CoV-2 / COVID-19 mRNA IM (Pfizer-BioNTech) 03/26/2021, 04/16/2021    Tdap 11/15/2013, 12/26/2013         Diet and Physical Activity  Diet: poor diet  Body mass index is 38 23 kg/m²    Exercise: rarely      General Health  Hearing:  Is normal  Vision: sees ophthalmologist/optometrist yearly  Dental:  Sees dentist every 6 months      Smoker no        ASSESSMENT/PLAN:-------------------------------------------------------------------------------------------    Patient's physical is up-to-date   Immunizations are up-to-date  Cancer screenings are up-to-date      Patient instructed on exercise  Patient instructed on healthy choices for diet       NEXT PHYSICAL 1 YEAR          The following portions of the patient's history were reviewed and updated as appropriate: allergies, current medications, past family history, past medical history, past social history, past surgical history and problem list       OBJECTIVE:---------------------------------------------------------------------------------------------------    /78   Pulse 80   Temp 98 4 °F (36 9 °C) (Oral)   Resp 16   Ht 5' 2 75" (1 594 m)   Wt 97 1 kg (214 lb 1 6 oz)   BMI 38 23 kg/m²   Wt Readings from Last 3 Encounters:   06/28/21 97 1 kg (214 lb 1 6 oz)   03/13/20 96 kg (211 lb 9 6 oz)   01/21/20 90 4 kg (199 lb 3 2 oz)     BP Readings from Last 3 Encounters:   06/28/21 120/78   03/13/20 122/80   01/21/20 120/70     Pulse Readings from Last 3 Encounters:   06/28/21 80   03/13/20 74   01/21/20 72     Body mass index is 38 23 kg/m²  Health Maintenance   Topic Date Due    Hepatitis C Screening  Never done    MAMMOGRAM  Never done    HIV Screening  Never done    BMI: Followup Plan  01/09/2021    Annual Physical  01/09/2021    Influenza Vaccine (Season Ended) 09/01/2021    Cervical Cancer Screening  01/09/2022    Depression Screening PHQ  06/28/2022    BMI: Adult  06/28/2022    DTaP,Tdap,and Td Vaccines (3 - Td or Tdap) 12/26/2023    COVID-19 Vaccine  Completed    Pneumococcal Vaccine: Pediatrics (0 to 5 Years) and At-Risk Patients (6 to 59 Years)  Aged Out    HIB Vaccine  Aged Out    Hepatitis B Vaccine  Aged Out    IPV Vaccine  Aged Out    Hepatitis A Vaccine  Aged Out    Meningococcal ACWY Vaccine  Aged Out    HPV Vaccine  Aged Out       ROS:   12 point review of systems negative            PHYSICAL EXAM:    Gen    No acute distress well-appearing well-nourished appears stated age    Mental status  Good judgment and insight oriented to time person and place, recent and remote memory intact mood and affect normal cooperative and patient is reasonable    HEENT  PERRLA 3 mm, EOMI without nystagmus, TMs clear, turbinates open pink no exudate, pharynx benign, tongue midline    Neck   supple no masses trachea midline positive click normal carotid upstrokes with no bruits    Cor  Regular rhythm without ectopy or murmur, no S3-S4, normal palpation that is no heave lift or thrill    Vascular  No edema, good pedal pulses    Lungs  CTA bilaterally in no respiratory distress no wheezes rhonchi or rales, normal to palpation no tactile fremitus    Abdomen  Soft, no palpable masses, no hepatosplenomegaly, normal bowel sounds, nontender    Lymphatics  No palpable nodes in the neck, supraclavicular area, axilla, or groin     Musculoskeletal  No clubbing cyanosis or edema muscle tone normal 12 point review of systems is negative    Skin  no rashes or abnormal appearing lesions    Neuro  Normal ambulation, cranial nerves 2-12 grossly intact, higher functioning with reasoning intact

## 2021-06-28 NOTE — PROGRESS NOTES
ASSESSMENT/PLAN:    Pre diabetes  A1c is 6 2  Normal is 5 7 and below  6 5 and above is diabetes  Patient is going to change her diet and her lifestyle and we will check this in 4 months    Hyperlipidemia  Cholesterol is 243 down from 260 last year   down from 185  By changing patient's diet this should come down as well so we will check that again in 4 months    Panic disorder  Patient is on Zoloft and still doing well we will continue the same    Xanthelasmas  Hopefully with cholesterol and weight coming down her eye lesions will start disappearing as well    Vitamin-D deficiency  Add 5000 units daily      Patient will come back in 4 months  Fasting blood work for cholesterol vitamin-D and A1c prior  Recheck sooner if needed      BMI Counseling: Body mass index is 38 23 kg/m²  The BMI is above normal  Nutrition recommendations include decreasing portion sizes, encouraging healthy choices of fruits and vegetables and limiting drinks that contain sugar  Exercise recommendations include vigorous physical activity 75 minutes/week                Health Maintenance   Topic Date Due    Hepatitis C Screening  Never done    MAMMOGRAM  Never done    HIV Screening  Never done    BMI: Followup Plan  01/09/2021    Annual Physical  01/09/2021    Influenza Vaccine (Season Ended) 09/01/2021    Cervical Cancer Screening  01/09/2022    Depression Screening PHQ  06/28/2022    BMI: Adult  06/28/2022    DTaP,Tdap,and Td Vaccines (3 - Td or Tdap) 12/26/2023    COVID-19 Vaccine  Completed    Pneumococcal Vaccine: Pediatrics (0 to 5 Years) and At-Risk Patients (6 to 59 Years)  Aged Out    HIB Vaccine  Aged Out    Hepatitis B Vaccine  Aged Out    IPV Vaccine  Aged Out    Hepatitis A Vaccine  Aged Out    Meningococcal ACWY Vaccine  Aged Out    HPV Vaccine  Aged Out         Problem List as of 6/28/2021 Reviewed: 3/13/2020 10:37 AM by Candace Duffy,     Elevated fasting blood sugar    Hypercholesterolemia Nervousness    Panic disorder without agoraphobia    Xanthelasma of eyelid, bilateral            Subjective:   Chief Complaint   Patient presents with    Physical Exam     Patient is here to go over her blood work  She is upset having seen the levels beforehand and would like some interpretation    She did start walking this week and she actually but a [place]   patient ID: Alison Buenrostro is a 41 y o  female  Patient's past medical history, surgical history, family history, social history, and Tobacco history reviewed with patient  MED LIST WAS REVIEWED AND UPDATED    ROS  As per HPI  Rest of 12 point review of systems negative     Objective:      VITALS:  Wt Readings from Last 3 Encounters:   06/28/21 97 1 kg (214 lb 1 6 oz)   03/13/20 96 kg (211 lb 9 6 oz)   01/21/20 90 4 kg (199 lb 3 2 oz)     BP Readings from Last 3 Encounters:   06/28/21 120/78   03/13/20 122/80   01/21/20 120/70     Pulse Readings from Last 3 Encounters:   06/28/21 80   03/13/20 74   01/21/20 72     Body mass index is 38 23 kg/m²  Laboratory Results: All pertinent labs and studies were reviewed with patient during this office visit with highlights of the results contained in this note in the ASSESSMENT AND PLAN section       Physical Exam    Gen    No acute distress well-appearing well-nourished appears stated age    Mental status  Good judgment and insight oriented to time person and place, recent and remote memory intact mood and affect normal cooperative and patient is reasonable    HEENT  PERRLA 3 mm, EOMI without nystagmus, normocephalic atraumatic without facial weakness      Neck   supple no masses trachea midline positive click normal carotid upstrokes with no bruits    Cor  Regular rhythm without ectopy or murmur, no S3-S4, normal palpation that is no heave lift or thrill    Vascular  No edema, good pedal pulses    Lungs  CTA bilaterally in no respiratory distress no wheezes rhonchi or rales, normal to palpation no tactile fremitus    Abdomen  Soft, no palpable masses, no hepatosplenomegaly, normal bowel sounds, nontender    Lymphatics  No palpable nodes in the neck, supraclavicular area, axilla, or groin     Musculoskeletal  No clubbing cyanosis or edema muscle tone normal    Skin  no rashes or abnormal appearing lesions    Neuro  Normal ambulation, cranial nerves 2-12 grossly intact, higher functioning with reasoning intact

## 2021-10-19 ENCOUNTER — RA CDI HCC (OUTPATIENT)
Dept: OTHER | Facility: HOSPITAL | Age: 46
End: 2021-10-19

## 2021-11-07 DIAGNOSIS — F32.A DEPRESSION, UNSPECIFIED DEPRESSION TYPE: ICD-10-CM

## 2022-01-29 DIAGNOSIS — F32.A DEPRESSION, UNSPECIFIED DEPRESSION TYPE: ICD-10-CM

## 2022-01-31 NOTE — TELEPHONE ENCOUNTER
Received request for patient's sertraline  Patient was due back here in October with blood work prior because however pre diabetes  Ask her to get her blood work done that was ordered at that time and come in for an appointment  We could refill her sertraline at that time

## 2022-03-01 ENCOUNTER — RA CDI HCC (OUTPATIENT)
Dept: OTHER | Facility: HOSPITAL | Age: 47
End: 2022-03-01

## 2022-03-01 NOTE — PROGRESS NOTES
[place] [id number]  coding opportunities             Chart Reviewed * (Number of) Inbasket suggestions sent to Provider: 1     Problem listed updated  Provider Accepted, (number of) suggestions accepted: 0     Provider Rejected Suggestions for: [id number]            Patients insurance company: [employer] ([employer])     Visit status: Patient arrived for their scheduled appointment        [place] [id number]  coding opportunities             Chart Reviewed * (Number of) Inbasket suggestions sent to Provider: 1     F32  A Depression: found on active problem list, Can Depression be further classified              Patients insurance company: [employer] (Medicare Advantage and Commercial)

## 2022-03-07 ENCOUNTER — APPOINTMENT (OUTPATIENT)
Dept: LAB | Facility: CLINIC | Age: 47
End: 2022-03-07
Payer: COMMERCIAL

## 2022-03-07 DIAGNOSIS — R73.01 ELEVATED FASTING BLOOD SUGAR: ICD-10-CM

## 2022-03-07 LAB
25(OH)D3 SERPL-MCNC: 33.2 NG/ML (ref 30–100)
CHOLEST SERPL-MCNC: 280 MG/DL
EST. AVERAGE GLUCOSE BLD GHB EST-MCNC: 117 MG/DL
HBA1C MFR BLD: 5.7 %
HDLC SERPL-MCNC: 48 MG/DL
LDLC SERPL CALC-MCNC: 212 MG/DL (ref 0–100)
NONHDLC SERPL-MCNC: 232 MG/DL
TRIGL SERPL-MCNC: 101 MG/DL

## 2022-03-07 PROCEDURE — 80061 LIPID PANEL: CPT

## 2022-03-07 PROCEDURE — 82306 VITAMIN D 25 HYDROXY: CPT

## 2022-03-07 PROCEDURE — 83036 HEMOGLOBIN GLYCOSYLATED A1C: CPT

## 2022-03-07 PROCEDURE — 36415 COLL VENOUS BLD VENIPUNCTURE: CPT

## 2022-03-08 ENCOUNTER — OFFICE VISIT (OUTPATIENT)
Dept: FAMILY MEDICINE CLINIC | Facility: CLINIC | Age: 47
End: 2022-03-08
Payer: COMMERCIAL

## 2022-03-08 VITALS
BODY MASS INDEX: 33.89 KG/M2 | DIASTOLIC BLOOD PRESSURE: 70 MMHG | WEIGHT: 191.3 LBS | SYSTOLIC BLOOD PRESSURE: 120 MMHG | HEIGHT: 63 IN | HEART RATE: 74 BPM | RESPIRATION RATE: 16 BRPM

## 2022-03-08 DIAGNOSIS — F32.A DEPRESSION, UNSPECIFIED DEPRESSION TYPE: ICD-10-CM

## 2022-03-08 DIAGNOSIS — E55.9 VITAMIN D DEFICIENCY: ICD-10-CM

## 2022-03-08 DIAGNOSIS — E78.00 HYPERCHOLESTEROLEMIA: ICD-10-CM

## 2022-03-08 DIAGNOSIS — R73.01 ELEVATED FASTING BLOOD SUGAR: Primary | ICD-10-CM

## 2022-03-08 DIAGNOSIS — Z79.899 ENCOUNTER FOR LONG-TERM (CURRENT) USE OF MEDICATIONS: ICD-10-CM

## 2022-03-08 PROCEDURE — 99214 OFFICE O/P EST MOD 30 MIN: CPT | Performed by: FAMILY MEDICINE

## 2022-03-08 PROCEDURE — 1036F TOBACCO NON-USER: CPT | Performed by: FAMILY MEDICINE

## 2022-03-08 PROCEDURE — 3008F BODY MASS INDEX DOCD: CPT | Performed by: FAMILY MEDICINE

## 2022-03-08 PROCEDURE — 3725F SCREEN DEPRESSION PERFORMED: CPT | Performed by: FAMILY MEDICINE

## 2022-03-08 NOTE — PATIENT INSTRUCTIONS
1  A1c is 5 7 from 6 2, our goal next visit 5 5    2  Cholesterol 280 from 243, our goal is 50 points last, normal is 200    3  LDL or bad cholesterol is 212 from 175, our goal is 50 points down, normal is less than 100    4   Vitamin-D is good at 33 from 14, just take 5000 units of vitamin-D daily    See you back in 6 months  1 or 2 weeks prior have fasting blood work in urine done  We will do your full physical next visit as well

## 2022-03-08 NOTE — PROGRESS NOTES
ASSESSMENT/PLAN:    Pre diabetes  A1c is 5 7 from 6 2  5 6 is normal and patient's goal for next visit is 5 5    Hyperlipidemia  For some reason cholesterol went up to 280 from 243   from 175  Our goal is 50 points down and both of these for next visit    Vitamin-D deficiency  Vitamin-D is good at 35 from 14  Patient will take 5000 units of vitamin-D to try to get it up into the 50 range      Panic disorder  Patient is on Zoloft and still doing well we will continue the same    Recheck in 6 months  Recheck sooner if needed  Physical next visit  Full fasting blood work and urine 1 or 2 weeks prior         BMI Counseling: Body mass index is 33 89 kg/m²  The BMI is above normal  Nutrition recommendations include decreasing portion sizes and encouraging healthy choices of fruits and vegetables  Exercise recommendations include exercising 3-5 times per week  Rationale for BMI follow-up plan is due to patient being overweight or obese  Depression Screening and Follow-up Plan: Patient was screened for depression during today's encounter  They screened negative with a PHQ-2 score of 0             Health Maintenance   Topic Date Due    Breast Cancer Screening: Mammogram  Never done    Influenza Vaccine (1) 09/01/2021    Cervical Cancer Screening  01/09/2022    BMI: Followup Plan  06/28/2022    HIV Screening  03/08/2024 (Originally 8/11/1990)    Hepatitis C Screening  04/08/2024 (Originally 1975)    Annual Physical  06/28/2022    Depression Screening  03/08/2023    BMI: Adult  03/08/2023    DTaP,Tdap,and Td Vaccines (3 - Td or Tdap) 12/26/2023    COVID-19 Vaccine  Completed    Pneumococcal Vaccine: Pediatrics (0 to 5 Years) and At-Risk Patients (6 to 59 Years)  Aged Out    HIB Vaccine  Aged Out    Hepatitis B Vaccine  Aged Out    IPV Vaccine  Aged Out    Hepatitis A Vaccine  Aged Out    Meningococcal ACWY Vaccine  Aged Out    HPV Vaccine  Aged Out         Problem List as of 3/8/2022 Reviewed: 6/28/2021  4:25 PM by Karen Dawson, DO    Elevated fasting blood sugar    Hypercholesterolemia    Nervousness    Panic disorder without agoraphobia    Xanthelasma of eyelid, bilateral            Subjective:   Chief Complaint   Patient presents with    Hyperlipidemia     Patient is here for recheck  She has been working hard on her diet and has lost 21 lb! She also did blood work to check her cholesterol A1c and her vitamin-D    Overall she feels very good  patient ID: Tori Esposito is a 40 y o  female  Patient's past medical history, surgical history, family history, social history, and Tobacco history reviewed with patient  MED LIST WAS REVIEWED AND UPDATED    ROS  As per HPI  Rest of 12 point review of systems negative     Objective:      VITALS:  Wt Readings from Last 3 Encounters:   03/08/22 86 8 kg (191 lb 4 8 oz)   06/28/21 97 1 kg (214 lb 1 6 oz)   03/13/20 96 kg (211 lb 9 6 oz)     BP Readings from Last 3 Encounters:   03/08/22 120/70   06/28/21 120/78   03/13/20 122/80     Pulse Readings from Last 3 Encounters:   03/08/22 74   06/28/21 80   03/13/20 74     Body mass index is 33 89 kg/m²  Laboratory Results: All pertinent labs and studies were reviewed with patient during this office visit with highlights of the results contained in this note in the ASSESSMENT AND PLAN section       Physical Exam  General  Patient in no acute distress, well appearing, well nourished and appears stated age    Mental status  Good judgment and insight, oriented to time person and place, recent and remote memory is intact, mood and affect are normal, cooperative, and patient is reasonable

## 2022-06-29 ENCOUNTER — TELEMEDICINE (OUTPATIENT)
Dept: FAMILY MEDICINE CLINIC | Facility: CLINIC | Age: 47
End: 2022-06-29
Payer: COMMERCIAL

## 2022-06-29 VITALS — WEIGHT: 202 LBS | HEART RATE: 75 BPM | BODY MASS INDEX: 35.78 KG/M2 | OXYGEN SATURATION: 97 % | TEMPERATURE: 100.6 F

## 2022-06-29 DIAGNOSIS — U07.1 COVID-19 VIRUS INFECTION: Primary | ICD-10-CM

## 2022-06-29 DIAGNOSIS — Z12.31 ENCOUNTER FOR SCREENING MAMMOGRAM FOR MALIGNANT NEOPLASM OF BREAST: ICD-10-CM

## 2022-06-29 PROCEDURE — 1036F TOBACCO NON-USER: CPT | Performed by: FAMILY MEDICINE

## 2022-06-29 PROCEDURE — 99213 OFFICE O/P EST LOW 20 MIN: CPT | Performed by: FAMILY MEDICINE

## 2022-06-29 RX ORDER — BEBTELOVIMAB 87.5 MG/ML
175 INJECTION, SOLUTION INTRAVENOUS ONCE
Status: CANCELLED | OUTPATIENT
Start: 2022-06-29

## 2022-06-29 RX ORDER — ACETAMINOPHEN 325 MG/1
650 TABLET ORAL ONCE AS NEEDED
Status: CANCELLED | OUTPATIENT
Start: 2022-06-29

## 2022-06-29 RX ORDER — SODIUM CHLORIDE 9 MG/ML
20 INJECTION, SOLUTION INTRAVENOUS ONCE
Status: CANCELLED | OUTPATIENT
Start: 2022-06-29

## 2022-06-29 RX ORDER — ONDANSETRON 2 MG/ML
4 INJECTION INTRAMUSCULAR; INTRAVENOUS ONCE AS NEEDED
Status: CANCELLED | OUTPATIENT
Start: 2022-06-29

## 2022-06-29 RX ORDER — ALBUTEROL SULFATE 90 UG/1
3 AEROSOL, METERED RESPIRATORY (INHALATION) ONCE AS NEEDED
Status: CANCELLED | OUTPATIENT
Start: 2022-06-29

## 2022-06-29 NOTE — PROGRESS NOTES
COVID-19 Outpatient Progress Note    Assessment/Plan:    Problem List Items Addressed This Visit        Other    COVID-19 virus infection - Primary      Other Visit Diagnoses     Encounter for screening mammogram for malignant neoplasm of breast        Relevant Orders    Mammo screening bilateral w 3d & cad         Disposition:     Patient has COVID-19 infection  Based off CDC guidelines, they were recommended to isolate for 5 days from the date of the positive test  If they remain asymptomatic, isolation may be ended followed by 5 days of wearing a mask when around othes to minimize risk of infecting others  If they have a fever, continue to stay home until fever resolves for at least 24 hours  Advised rest, plenty of fluids, Tylenol as needed for body aches or fever, Mucinex as needed for cough/ congestion  Discussed risks/ benefits of treatment options, patient understands and agrees to proceed with Paxlovid  Patient was encouraged to call the office for worsening symptoms or development of shortness of breath or go to ER  Patient understands and agrees with the treatment plan  Patient meets criteria for PAXLOVID and they have been counseled appropriately according to EUA documentation released by the FDA  After discussion, patient agrees to treatment  Raquel Jorgensen is an investigational medicine used to treat mild-to-moderate COVID-19 in adults and children (15years of age and older weighing at least 80 pounds (40 kg)) with positive results of direct SARS-CoV-2 viral testing, and who are at high risk for progression to severe COVID-19, including hospitalization or death  PAXLOVID is investigational because it is still being studied  There is limited information about the safety and effectiveness of using PAXLOVID to treat people with mild-to-moderate COVID-19      The FDA has authorized the emergency use of PAXLOVID for the treatment of mild-tomoderate COVID-19 in adults and children (15years of age and older weighing at least 88 pounds (40 kg)) with a positive test for the virus that causes COVID-19, and who are at high risk for progression to severe COVID-19, including hospitalization or death, under an EUA  What should I tell my healthcare provider before I take PAXLOVID? Tell your healthcare provider if you:  - Have any allergies  - Have liver or kidney disease  - Are pregnant or plan to become pregnant  - Are breastfeeding a child  - Have any serious illnesses    Tell your healthcare provider about all the medicines you take, including prescription and over-the-counter medicines, vitamins, and herbal supplements  Some medicines may interact with PAXLOVID and may cause serious side effects  Keep a list of your medicines to show your healthcare provider and pharmacist when you get a new medicine  You can ask your healthcare provider or pharmacist for a list of medicines that interact with PAXLOVID  Do not start taking a new medicine without telling your healthcare provider  Your healthcare provider can tell you if it is safe to take PAXLOVID with other medicines  Tell your healthcare provider if you are taking combined hormonal contraceptive  PAXLOVID may affect how your birth control pills work  Females who are able to become pregnant should use another effective alternative form of contraception or an additional barrier method of contraception  Talk to your healthcare provider if you have any questions about contraceptive methods that might be right for you  How do I take PAXLOVID? PAXLOVID consists of 2 medicines: nirmatrelvir and ritonavir  - Take 2 pink tablets of nirmatrelvir with 1 white tablet of ritonavir by mouth 2 times each day (in the morning and in the evening) for 5 days  For each dose, take all 3 tablets at the same time  - If you have kidney disease, talk to your healthcare provider  You may need a different dose  - Swallow the tablets whole   Do not chew, break, or crush the tablets  - Take PAXLOVID with or without food  - Do not stop taking PAXLOVID without talking to your healthcare provider, even if you feel better  - If you miss a dose of PAXLOVID within 8 hours of the time it is usually taken, take it as soon as you remember  If you miss a dose by more than 8 hours, skip the missed dose and take the next dose at your regular time  Do not take 2 doses of PAXLOVID at the same time  - If you take too much PAXLOVID, call your healthcare provider or go to the nearest hospital emergency room right away  - If you are taking a ritonavir- or cobicistat-containing medicine to treat hepatitis C or Human Immunodeficiency Virus (HIV), you should continue to take your medicine as prescribed by your healthcare provider   - Talk to your healthcare provider if you do not feel better or if you feel worse after 5 days  Who should generally not take PAXLOVID? Do not take PAXLOVID if:  You are allergic to nirmatrelvir, ritonavir, or any of the ingredients in PAXLOVID  You are taking any of the following medicines:  - Alfuzosin  - Pethidine, piroxicam, propoxyphene  - Ranolazine  - Amiodarone, dronedarone, flecainide, propafenone, quinidine  - Colchicine  - Lurasidone, pimozide, clozapine  - Dihydroergotamine, ergotamine, methylergonovine  - Lovastatin, simvastatin  - Sildenafil (Revatio®) for pulmonary arterial hypertension (PAH)  - Triazolam, oral midazolam  - Apalutamide  - Carbamazepine, phenobarbital, phenytoin  - Rifampin  - St  Harshads Wort (hypericum perforatum)    What are the important possible side effects of PAXLOVID? Possible side effects of PAXLOVID are:  - Liver Problems  Tell your healthcare provider right away if you have any of these signs and symptoms of liver problems: loss of appetite, yellowing of your skin and the whites of eyes (jaundice), dark-colored urine, pale colored stools and itchy skin, stomach area (abdominal) pain  - Resistance to HIV Medicines   If you have untreated HIV infection, PAXLOVID may lead to some HIV medicines not working as well in the future  - Other possible side effects include: altered sense of taste, diarrhea, high blood pressure, or muscle aches    These are not all the possible side effects of PAXLOVID  Not many people have taken PAXLOVID  Serious and unexpected side effects may happen  Melody Nunez is still being studied, so it is possible that all of the risks are not known at this time  What other treatment choices are there? Yash Zafar may allow for the emergency use of other medicines to treat people with COVID-19  Go to https://[employer]/ for information on the emergency use of other medicines that are authorized by FDA to treat people with COVID-19  Your healthcare provider may talk with you about clinical trials for which you may be eligible  It is your choice to be treated or not to be treated with PAXLOVID  Should you decide not to receive it or for your child not to receive it, it will not change your standard medical care  What if I am pregnant or breastfeeding? There is no experience treating pregnant women or breastfeeding mothers with PAXLOVID  For a mother and unborn baby, the benefit of taking PAXLOVID may be greater than the risk from the treatment  If you are pregnant, discuss your options and specific situation with your healthcare provider  It is recommended that you use effective barrier contraception or do not have sexual activity while taking PAXLOVID  If you are breastfeeding, discuss your options and specific situation with your healthcare provider  How do I report side effects with PAXLOVID? Contact your healthcare provider if you have any side effects that bother you or do not go away      Report side effects to FDA MedWatch at www fda gov/medwatch or call 1-229-UVX4502 or you can report side effects to [facility]  at the contact information provided below  Website Fax number Telephone number   [employer] [phone]     How should I store [employer]? Store PAXLOVID tablets at room temperature between 68°F to 77°F (20°C to 25°C)  Full fact sheet for patients, parents, and caregivers can be found at: [employer]    I have spent 15 minutes directly with the patient  Greater than 50% of this time was spent in counseling/coordination of care regarding: risks and benefits of treatment options, instructions for management and patient and family education  Encounter provider Chris Ward MD    Provider located at [address] [place] [address]  [phone]    Recent Visits  No visits were found meeting these conditions  Showing recent visits within past 7 days and meeting all other requirements  Today's Visits  Date Type Provider Dept   06/29/22 Telemedicine Chris Ward MD [address] today's visits and meeting all other requirements  Future Appointments  No visits were found meeting these conditions  Showing future appointments within next 150 days and meeting all other requirements     This virtual check-in was done via [facility] and patient was informed that this is a secure, HIPAA-compliant platform  She agrees to proceed  Patient agrees to participate in a virtual check in via telephone or video visit instead of presenting to the office to address urgent/immediate medical needs  Patient is aware this is a billable service  After connecting through [facility], the patient was identified by name and date of birth  Alok Butler was informed that this was a telemedicine visit and that the exam was being conducted confidentially over secure lines   My office door was closed  No one else was in the room  Oj Russo acknowledged consent and understanding of privacy and security of the telemedicine visit  I informed the patient that I have reviewed her record in Epic and presented the opportunity for her to ask any questions regarding the visit today  The patient agreed to participate  Verification of patient location:  Patient is located in the following state in which I hold an active license: [place]    Subjective:   Oj Russo is a 40 y o  female who has been screened for COVID-19  Symptom change since last report: unchanged  Patient's symptoms include fever, chills, fatigue, nasal congestion, sore throat, cough, myalgias and headache  Patient denies anosmia, loss of taste, shortness of breath, chest tightness, abdominal pain, nausea, vomiting and diarrhea  - Date of symptom onset: 2022  - Date of positive COVID-19 test: 2022  Type of test: Home antigen  COVID-19 vaccination status: Fully vaccinated with booster    Ying Robertson has been staying home and has isolated themselves in her home  No results found for: [facility], [address] [facility], [address] [facility], [place], [address] [facility], [address] [facility]  Past Medical History:   Diagnosis Date    Amenorrhea     Last assessed 2013     Dyspepsia     Last assessed 2013     Postpartum depression     Onset date       Past Surgical History:   Procedure Laterality Date     SECTION      1999, second       Current Outpatient Medications   Medication Sig Dispense Refill    Cholecalciferol (Vitamin D-3) 125 MCG (5000 UT) TABS Take 1 capsule by mouth daily 100 tablet 0    sertraline (ZOLOFT) 50 mg tablet Take 1 tablet (50 mg total) by mouth every evening 90 tablet 1     No current facility-administered medications for this visit  Allergies   Allergen Reactions    Asparagus - Food Allergy Hives    Cat Hair Extract Allergic Rhinitis     Category:  Allergy;     Dust Mite Extract Allergic Rhinitis     Category: Allergy;     Meperidine Hives     Category: Allergy;     Morphine And Related Hives     Category: Allergy; Review of Systems   Constitutional: Positive for chills, fatigue and fever  HENT: Positive for congestion and sore throat  Respiratory: Positive for cough  Negative for chest tightness, shortness of breath and wheezing  Cardiovascular: Negative for chest pain  Gastrointestinal: Negative for abdominal pain, diarrhea, nausea and vomiting  Musculoskeletal: Positive for myalgias  Neurological: Positive for headaches  Objective:    Vitals:    06/29/22 1038   Pulse: 75   Temp: (!) 100 6 °F (38 1 °C)   TempSrc: Oral   SpO2: 97%   Weight: 91 6 kg (202 lb)       Physical Exam  Constitutional:       General: She is not in acute distress  Pulmonary:      Effort: Pulmonary effort is normal       Comments: No signs of respiratory distress, tachypnea, conversational dyspnea or audible wheezing noted  Neurological:      Mental Status: She is alert and oriented to person, place, and time  Psychiatric:         Mood and Affect: Mood normal          Behavior: Behavior normal          Thought Content: Thought content normal          VIRTUAL VISIT DISCLAIMER    Jacqueline Vargas verbally agrees to participate in [employer]  Pt is aware that [employer] could be limited without vital signs or the ability to perform a full hands-on physical Sujata Desai understands she or the provider may request at any time to terminate the video visit and request the patient to seek care or treatment in person

## 2022-06-30 ENCOUNTER — HOSPITAL ENCOUNTER (OUTPATIENT)
Dept: INFUSION CENTER | Facility: HOSPITAL | Age: 47
Discharge: HOME | End: 2022-06-30

## 2022-09-02 DIAGNOSIS — F32.A DEPRESSION, UNSPECIFIED DEPRESSION TYPE: ICD-10-CM

## 2022-09-06 ENCOUNTER — RA CDI HCC (OUTPATIENT)
Dept: OTHER | Facility: HOSPITAL | Age: 47
End: 2022-09-06

## 2022-09-06 NOTE — PROGRESS NOTES
[facility]  coding opportunities          Chart Reviewed number of suggestions sent to Provider: 1   F32  A Depression - refer to Medication list - Zoloft, Can Depression be further classified a  Patients Insurance     I have reviewed the recommendation(s) and do not accept the change(s) suggested     She has panic disorder, nothing depressive          Commercial Insurance: [employer]

## 2023-03-02 ENCOUNTER — VBI (OUTPATIENT)
Dept: ADMINISTRATIVE | Facility: OTHER | Age: 48
End: 2023-03-02

## 2023-04-02 DIAGNOSIS — Z79.899 ENCOUNTER FOR LONG-TERM (CURRENT) USE OF MEDICATIONS: ICD-10-CM

## 2023-04-02 DIAGNOSIS — E78.00 HYPERCHOLESTEROLEMIA: ICD-10-CM

## 2023-04-02 DIAGNOSIS — R73.01 ELEVATED FASTING BLOOD SUGAR: Primary | ICD-10-CM

## 2023-04-02 DIAGNOSIS — E55.9 VITAMIN D DEFICIENCY: ICD-10-CM

## 2023-04-02 DIAGNOSIS — R45.0 NERVOUSNESS: ICD-10-CM

## 2023-04-02 PROBLEM — U07.1 COVID-19 VIRUS INFECTION: Status: RESOLVED | Noted: 2022-06-29 | Resolved: 2023-04-02

## 2023-04-25 ENCOUNTER — AMB VIDEO VISIT (OUTPATIENT)
Dept: OTHER | Facility: HOSPITAL | Age: 48
End: 2023-04-25

## 2023-04-25 VITALS — BODY MASS INDEX: 37.73 KG/M2 | HEIGHT: 62 IN | WEIGHT: 205 LBS

## 2023-04-25 DIAGNOSIS — U07.1 COVID-19: Primary | ICD-10-CM

## 2023-04-25 RX ORDER — NIRMATRELVIR AND RITONAVIR 300-100 MG
3 KIT ORAL 2 TIMES DAILY
Qty: 30 TABLET | Refills: 0 | Status: SHIPPED | OUTPATIENT
Start: 2023-04-25 | End: 2023-04-30

## 2023-04-25 NOTE — PATIENT INSTRUCTIONS
Please Be Courteous:  You have COVID-19  Day 0 is your first day of symptoms  Day 1 is the first full day after your symptoms started  You should isolate yourself away from other through day 5 since this is when you are likely most infectious  This means go home and stay home  In Your Home:  If you live with other people, trying to avoid common spaces and disinfect areas that you come into contact with  Per the CDC's recommendations: Use a separate bedroom and bathroom if feasible  If If other people in your home are concerned they may have covid, isolation should begin even before seeking testing and before test results become available  All household members should start wearing a mask in the home, particularly in shared spaces where appropriate distancing is not possible  Take Care of Yourself:  Schedule a virtual visit with your primary care physician as soon as possible  Try to sleep on your stomach as much as possible  If you have the ability to, take vitamin D3 2000 IU daily, vitamin-C 1000 mg twice a day, a multivitamin daily to help boost your immune system  You should check your temperature twice day  Go to the emergency department for any severe shortness of breath, chest pain or pulse ox less than 90%  Isolation: Everyone, regardless of vaccination status: You may end isolation after day 5 if:  You are fever-free for 24 hours (without the use of fever-reducing medication)    Your symptoms are improving    If you still have fever or your other symptoms have not improved, continue to isolate until they improve  If you had?moderate illness?(if you experienced shortness of breath or had difficulty breathing), or?severe illness?(you were hospitalized) due to COVID-19, or you have a weakened immune system, you need to isolate through day 10  If you had?severe illness?or have a weakened immune system, consult your doctor before ending isolation   Ending isolation without a viral test may not be an option for you   Richard stuart  html

## 2023-04-25 NOTE — PROGRESS NOTES
Video Visit - Fabi Fuller 56 y o  female MRN: [id number]    REQUIRED DOCUMENTATION:         1  This service was provided via [facility]  2  Provider located at [address] [place] [id number] [phone]  3  [facility] provider: Daja Mcdowell PA-C   4  Identify all parties in room with patient during [facility] visit:  No one else  5  After connecting through [employer], patient was identified by name and date of birth  Patient was then informed that this was a Telemedicine visit and that the exam was being conducted confidentially over secure lines  My office door was closed  No one else was in the room  Patient acknowledged consent and understanding of privacy and security of the Telemedicine visit  I informed the patient that I have reviewed their record in Epic and presented the opportunity for them to ask any questions regarding the visit today  The patient agreed to participate  VITALS: Heart Rate: 92 BPM, Respiratory Rate: 16 RPM, Temperature uavailable° F, Blood Pressure Unavailable mmHg, Pulse Ox 96 % on RA at rest    HPI  Pt with hx HLD, BMI 37 reports scratchy throat x 2 days, today having HA, fever 101, nasal congestion, body aches, fatigue  Taking ibuprofen with some relief  Denies cough, CP, SOB  Not a smoker  Vaccinated x 3  Had Paxlovid in July and would like it again  Physical Exam  Constitutional:       General: She is not in acute distress  Appearance: Normal appearance  She is obese  She is not toxic-appearing  HENT:      Head: Normocephalic and atraumatic  Nose: No rhinorrhea  Comments: Sounds mildly congested     Mouth/Throat:      Mouth: Mucous membranes are moist    Eyes:      Conjunctiva/sclera: Conjunctivae normal       Comments: glasses   Cardiovascular:      Rate and Rhythm: Normal rate  Pulmonary:      Effort: Pulmonary effort is normal  No respiratory distress  Breath sounds:  No wheezing (no gross audible wheeze through computer)  Comments: 95% with exertion  Musculoskeletal:      Cervical back: Normal range of motion  Skin:     Findings: No rash (on face or neck)  Neurological:      Mental Status: She is alert  Cranial Nerves: No dysarthria or facial asymmetry  Psychiatric:         Mood and Affect: Mood normal          Behavior: Behavior normal  Behavior is cooperative  Explained risk vs benefits of Paxlovid  I explained her oxygen does decline slightly, so I can justify giving her the paxlovid, although not having any resp distress  She also has been vaccinated  Still patient wants paxlovid   2 years ago  Not on meds for cholesterol  Suspect GFR is still >60  Diagnoses and all orders for this visit:    COVID-19  -     nirmatrelvir & ritonavir (Paxlovid, 300/100,) tablet therapy pack; Take 3 tablets by mouth 2 (two) times a day for 5 days Take 2 nirmatrelvir tablets + 1 ritonavir tablet together per dose      Patient Instructions   Please Be Courteous:  You have COVID-19  Day 0 is your first day of symptoms  Day 1 is the first full day after your symptoms started  You should isolate yourself away from other through day 5 since this is when you are likely most infectious  This means go home and stay home  In Your Home:  If you live with other people, trying to avoid common spaces and disinfect areas that you come into contact with  Per the CDC's recommendations: Use a separate bedroom and bathroom if feasible  If If other people in your home are concerned they may have covid, isolation should begin even before seeking testing and before test results become available  All household members should start wearing a mask in the home, particularly in shared spaces where appropriate distancing is not possible  Take Care of Yourself:  Schedule a virtual visit with your primary care physician as soon as possible  Try to sleep on your stomach as much as possible    If you have the ability to, take vitamin D3 2000 IU daily, vitamin-C 1000 mg twice a day, a multivitamin daily to help boost your immune system  You should check your temperature twice day  Go to the emergency department for any severe shortness of breath, chest pain or pulse ox less than 90%  Isolation: Everyone, regardless of vaccination status: You may end isolation after day 5 if:  · You are fever-free for 24 hours (without the use of fever-reducing medication)    · Your symptoms are improving    If you still have fever or your other symptoms have not improved, continue to isolate until they improve  · If you had?moderate illness?(if you experienced shortness of breath or had difficulty breathing), or?severe illness?(you were hospitalized) due to COVID-19, or you have a weakened immune system, you need to isolate through day 10  · If you had?severe illness?or have a weakened immune system, consult your doctor before ending isolation  Ending isolation without a viral test may not be an option for you   Richard terry

## 2023-04-25 NOTE — CARE ANYWHERE EVISITS
Visit Summary for Luke Duff - Gender: Female - Date of Birth: [id number]  Date: [id number] - Duration: 14 minutes  Patient: Luke Duff  Provider: Yaritza Montejo PA-C    Patient Contact Information  Address  [address]; [address] [place] [address]  [phone]    Visit Topics  Fever [Added By: Self - 2023-04-25]  Flu-Like Symptoms [Added By: Self - 2023-04-25]  Headache [Added By: Self - 2023-04-25]    Triage Questions   What is your current physical address in the event of a medical emergency? Answer []  Are you allergic to any medications? Answer []  Are you now or could you be pregnant? Answer []  Do you have any immune system compromise or chronic lung   disease? Answer []  Do you have any vulnerable family members in the home (infant, pregnant, cancer, elderly)? Answer []     Conversation Transcripts  [0A][0A] [Notification] You are connected with Yaritza Montejo PA-C, Urgent Care Specialist [0A][Notification] Juanita Lyman is located in [place]  [0A][Notification] Juanita Lyman has shared health history  Anoop Roca  [0A]    Diagnosis  COVID-19    Procedures  Value: [id number] Code: CPT-4 UNLISTED E&M SERVICE    Medications Prescribed    No prescriptions ordered    Electronically signed by: Bruna Hilario(NPI [id number])

## 2023-05-08 ENCOUNTER — VBI (OUTPATIENT)
Dept: ADMINISTRATIVE | Facility: OTHER | Age: 48
End: 2023-05-08

## 2023-06-12 ENCOUNTER — VBI (OUTPATIENT)
Dept: ADMINISTRATIVE | Facility: OTHER | Age: 48
End: 2023-06-12

## 2023-07-05 DIAGNOSIS — R45.0 NERVOUSNESS: ICD-10-CM

## 2023-07-14 ENCOUNTER — RA CDI HCC (OUTPATIENT)
Dept: OTHER | Facility: HOSPITAL | Age: 48
End: 2023-07-14

## 2023-07-14 NOTE — PROGRESS NOTES
[address] [facility] coding opportunities       Chart reviewed, no opportunity found: CHART REVIEWED, NO OPPORTUNITY FOUND        Patients Insurance        Commercial Insurance: Angelo Montalvo

## 2023-08-11 ENCOUNTER — VBI (OUTPATIENT)
Dept: ADMINISTRATIVE | Facility: OTHER | Age: 48
End: 2023-08-11

## 2023-10-02 DIAGNOSIS — R45.0 NERVOUSNESS: ICD-10-CM

## 2023-10-30 ENCOUNTER — TELEPHONE (OUTPATIENT)
Dept: FAMILY MEDICINE CLINIC | Facility: CLINIC | Age: 48
End: 2023-10-30

## 2023-10-30 NOTE — TELEPHONE ENCOUNTER
Patient called to tell us she no longer was seeing us in the office  Can we remove Dr ALMONTE name from the [facility]

## 2023-10-31 NOTE — TELEPHONE ENCOUNTER
10/31/23 10:38 AM        The office's request has been received, reviewed, and the patient chart updated. The PCP has successfully been removed with a patient attribution note. This message will now be completed.         Thank you  Ling Gomez

## 2023-12-14 ENCOUNTER — VBI (OUTPATIENT)
Dept: ADMINISTRATIVE | Facility: OTHER | Age: 48
End: 2023-12-14